# Patient Record
Sex: MALE | HISPANIC OR LATINO | Employment: OTHER | URBAN - METROPOLITAN AREA
[De-identification: names, ages, dates, MRNs, and addresses within clinical notes are randomized per-mention and may not be internally consistent; named-entity substitution may affect disease eponyms.]

---

## 2024-09-11 ENCOUNTER — OFFICE VISIT (OUTPATIENT)
Dept: URGENT CARE | Facility: URGENT CARE | Age: 69
End: 2024-09-11
Payer: MEDICARE

## 2024-09-11 VITALS
OXYGEN SATURATION: 96 % | SYSTOLIC BLOOD PRESSURE: 161 MMHG | WEIGHT: 205.7 LBS | TEMPERATURE: 98.6 F | DIASTOLIC BLOOD PRESSURE: 81 MMHG | RESPIRATION RATE: 18 BRPM | HEART RATE: 91 BPM

## 2024-09-11 DIAGNOSIS — H92.01 RIGHT EAR PAIN: Primary | ICD-10-CM

## 2024-09-11 DIAGNOSIS — K21.00 GASTROESOPHAGEAL REFLUX DISEASE WITH ESOPHAGITIS WITHOUT HEMORRHAGE: ICD-10-CM

## 2024-09-11 DIAGNOSIS — H69.91 DYSFUNCTION OF RIGHT EUSTACHIAN TUBE: ICD-10-CM

## 2024-09-11 PROCEDURE — 99204 OFFICE O/P NEW MOD 45 MIN: CPT | Performed by: PHYSICIAN ASSISTANT

## 2024-09-11 RX ORDER — ASPIRIN 81 MG/1
81 TABLET ORAL DAILY
COMMUNITY
Start: 2022-11-10

## 2024-09-11 RX ORDER — VITAMIN B COMPLEX
1 TABLET ORAL DAILY
COMMUNITY
Start: 2022-11-20

## 2024-09-11 RX ORDER — METFORMIN HCL 500 MG
1000 TABLET, EXTENDED RELEASE 24 HR ORAL 2 TIMES DAILY WITH MEALS
COMMUNITY
Start: 2022-11-18

## 2024-09-11 RX ORDER — SIMVASTATIN 10 MG
10 TABLET ORAL DAILY
COMMUNITY
Start: 2022-11-24

## 2024-09-11 RX ORDER — FLUTICASONE PROPIONATE 50 MCG
2 SPRAY, SUSPENSION (ML) NASAL DAILY
Qty: 16 G | Refills: 3 | Status: SHIPPED | OUTPATIENT
Start: 2024-09-11

## 2024-09-11 RX ORDER — OMEPRAZOLE 40 MG/1
CAPSULE, DELAYED RELEASE ORAL
Qty: 14 CAPSULE | Refills: 0 | Status: SHIPPED | OUTPATIENT
Start: 2024-09-11

## 2024-09-11 RX ORDER — LISINOPRIL 10 MG/1
1 TABLET ORAL DAILY
COMMUNITY
Start: 2023-10-20

## 2024-09-11 NOTE — PROGRESS NOTES
Assessment & Plan     Right ear pain    Dysfunction of right eustachian tube  - fluticasone (FLONASE) 50 MCG/ACT nasal spray; Spray 2 sprays into both nostrils daily.    Gastroesophageal reflux disease with esophagitis without hemorrhage  Chronic acid reflux, worsening recently.  - omeprazole (PRILOSEC) 40 MG DR capsule; Take  one tablet 40mg once daily, 30 min before breakfast, for 2 weeks    We discussed right ear pain.  Symptoms seem to most consistent with acid reflux.  I recommend treatment with omeprazole.  I also recommend Flonase to help reduce swelling inflammation of eustachian tube.  Differential diagnosis includes TMJ, otitis externa, otitis media, eustachian tube dysfunction, postnasal drainage, seasonal allergies.    Return in about 1 week (around 9/18/2024) for visit with primary care provider if not improving.     Rosalia Santiago PA-C  [facility]    Subjective   Lucille Rooney is a 69 year old who presents for the following health issues     Patient presents with:  Ear Problem: Right ear has been feeling a painful/burning sensation, worse at night, feels clogged       HPI    Lucille presents clinic today for evaluation of right ear pain.  Symptoms first began 8 days ago.  He feels an itching tickling and muffled sensation inside his ear.  No discharge or drainage.  No other URI symptoms.  He does have a history of acid reflux.  He had hiatal hernia repair 2 years ago but states that he still had reflux afterward.    Review of Systems   ROS negative except as stated above.      Objective    BP (!) 161/81 (BP Location: Left arm, Patient Position: Sitting, Cuff Size: Adult Regular)   Pulse 91   Temp 98.6  F (37  C) (Tympanic)   Resp 18   Wt 93.3 kg (205 lb 11.2 oz)   SpO2 96%   Physical Exam   GENERAL: alert and no distress  EYES: Eyes grossly normal to inspection, PERRL and conjunctivae and sclerae normal  HENT: ear canals and TM's normal, nose and mouth  without ulcers or lesions, posterior pharynx pink, nonerythematous.  No tenderness to palpation of TMJ, no clicking or popping with extension of jaw.  NECK: no adenopathy, no asymmetry, masses, or scars  RESP: lungs clear to auscultation - no rales, rhonchi or wheezes  CV: regular rate and rhythm, normal S1 S2, no S3 or S4, no murmur, click or rub, no peripheral edema    No results found for any visits on 09/11/24.